# Patient Record
Sex: MALE | URBAN - METROPOLITAN AREA
[De-identification: names, ages, dates, MRNs, and addresses within clinical notes are randomized per-mention and may not be internally consistent; named-entity substitution may affect disease eponyms.]

---

## 2021-11-08 ENCOUNTER — PATIENT OUTREACH (OUTPATIENT)
Dept: GERIATRIC MEDICINE | Facility: CLINIC | Age: 66
End: 2021-11-08
Payer: COMMERCIAL

## 2021-11-08 NOTE — LETTER
November 8, 2021    EVELIO DIGGS  [address] [facility] [address]    Dear  Evelio,    Welcome to [facility] s [place] [facility] Plus ([facility] health program. My name is GONSALO Solomon. I am your [facility] care coordinator. You are eligible for Care Coordination through [facility]+ Product.    Here is how Care Coordination works:    I will meet with you in person to determine your care coordination needs    We will develop a plan of care to meet your needs    We will create a service plan showing the services you will receive    We will talk about and coordinate any preventive care needs you have    I will call you soon to see how you are doing and determine what needs you may have. Our goal is to keep you as healthy and independent as possible.     Soon, you will receive a new [facility] member identification (ID) card from [facility]. When you receive it, please use this card along with your [place] Health Care Programs card and Prescription Drug Coverage Program card. When you receive, it please use this card where you get your health services. If you have Medicare, you will need to show your Medicare card when you get health services.    Being in the [place] [facility] Plus ([facility]+) Care Coordination program is voluntary and offered to you at no cost. If you ever wish to stop being in the Care Coordination program or have questions, call me at [phone]. If you reach my voice mail, leave a message and your phone number. If you are hearing impaired, please call the [place] Relay at [phone] or [phone] (srbyov-xy-julmuq relay service).    Sincerely,      GONSALO Solomon    E-mail: [email or web address]  Phone: [phone]      [employer]      [id number] accepted   (12/2019)

## 2021-11-08 NOTE — LETTER
November 24, 2021    EVELIO DIGGS  [address] [facility] [address]    Dear Evelio:     Your Care Coordinator has been unable to reach you by telephone.I am writing to ask you or an authorized representative to call me at [phone]. If you reach my voicemail, please leave a message with your daytime telephone number and a date and time that I can call you. If you are hearing impaired, please call the [place] Relay at 271 or [phone] (obvipe-zr-ylbrgm relay service).     The reason I am trying to reach you is:     [] Six (6) month check-in  [] To schedule your annual assessment  [x] Other: To schedule your initial assessment      Please call me as soon as you receive this letter. I look forward to speaking with you.    Sincerely,    GONSALO Solomon    E-mail: [email or web address]  Phone: [phone]      [facility]+ [id number] DHS Approved([id number])    [id number] (2/19)

## 2021-11-08 NOTE — PROGRESS NOTES
[facility] Care Coordination Contact    Member became effective with [facility] on 11/1/2021 with Suzie MSC+.  Previous Health Plan: PMAP  Previous Care System: [facility]  Previous care coordinators name and number: NA, no MMIS  Waiver Type: N/A  Last MMIS Entry: Date NA and Type NA  MMIS visit date (and type) if different from above: NA  Services Listed in MMIS: NA  UTF received: No: Requested on No UTF to request as no MMIS or services.      Aliya Hairston  Care Management Specialist  [facility]  [phone]

## 2021-11-19 NOTE — PROGRESS NOTES
11/19/2021  SANKET/OMAIRA Carmichael. Introduced self as new CC and asked for a call back.    Lupe Griffin THOMAS  [employer]   [phone] (Cell)  [phone] (Office)  [phone] (fax)  [email or web address]

## 2021-11-23 NOTE — PROGRESS NOTES
11/23/21  L/M using phone  for Amol. Asked for a call back.    GONSALO Solomon  [facility]   [phone] (Cell)  [phone] (Office)  [phone] (fax)  [email or web address]

## 2021-11-24 NOTE — PROGRESS NOTES
"[facility] Care Coordination Contact    Per CC, mailed client an \"Unable to Contact\" letter for initial assessment.     Aliya Hairston  Care Management Specialist  [facility]  [phone]      "

## 2021-11-30 NOTE — PROGRESS NOTES
11/30/21  PC to member using phone . No answer and no working VM.    Completed 4 attempts to reach client with no response.  Member is officially unable to contact effective today.  Completed MMIS entry.  Completed health plan required [facility] POC.    Follow-up Plan: CC will attempt to reach member in six months.    GONSALO Solomon  [facility]   [phone] (Cell)  [phone] (Office)  [phone] (fax)  [email or web address]

## 2022-04-19 ENCOUNTER — APPOINTMENT (OUTPATIENT)
Dept: INTERPRETER SERVICES | Facility: CLINIC | Age: 67
End: 2022-04-19

## 2022-06-24 ENCOUNTER — PATIENT OUTREACH (OUTPATIENT)
Dept: GERIATRIC MEDICINE | Facility: CLINIC | Age: 67
End: 2022-06-24

## 2022-06-24 NOTE — PROGRESS NOTES
[facility] Six-Month Telephone Assessment    6 month telephone assessment completed on 6/24/22. First time speaking with Amol. He was an [employer] at initial transfer. Daughter helped interpret. He reports he goes to [facility] in [place] for primary care. Explained that Chevy will be notified to transfer to [facility] for Care coordination.  He would like an assessment. Reports he is independent in all areas, drives, and works. Will complete HRA assessment on 6/27/22.    ER visits: No  Hospitalizations: No  TCU stays: No  Significant health status changes: no  Falls/Injuries: No  ADL/IADL changes: No  Changes in services: No    Caregiver Assessment follow up:  n/a    Goals: See POC in chart for goal progress documentation.  n/a    Will see member in 6 months for an annual health risk assessment.   Encouraged member to call CC with any questions or concerns in the meantime.     GONSALO Solomon  [facility]   [phone] (Cell)  [phone] (Office)  [phone] (fax)  [email or web address]

## 2022-06-27 ENCOUNTER — PATIENT OUTREACH (OUTPATIENT)
Dept: GERIATRIC MEDICINE | Facility: CLINIC | Age: 67
End: 2022-06-27

## 2022-06-27 NOTE — LETTER
June 28, 2022    EVELIO DIGGS  [address] [place] [id number]        Dear Evelio:    As a member of [facility]'s MSC+ program, we offer a health risk assessment at no cost to you. I know you don't want to have the assessment right now. If you change your mind, please call me at the number below.    Who performs the health risk assessment?  A [facility] Care Coordinator performs the assessment. Our Care Coordinators can also help you understand your benefits. They can tell you about services to aid you at home, such as managing your care with your doctors if your health worsens.    Our Care Coordinators are here for you if you need:    Support for activities you used to do by yourself (including making meals, bathing and paying bills)    Equipment for bathroom or home safety    Help finding a new place to live    Information on staying healthy, preventing falls and immunizations    Questions?  If you have questions, or you would like to do he assessment, call me at [phone]. TTY users call [phone]. I'm here from 8am to 5pm. I may reach out to you again soon.       Sincerely,         GONSALO Solomon    E-mail: [email or web address]  Phone: [phone]      [employer]        \<[id number] accepted  [id number]>    [id number] (21/2021)

## 2022-06-28 NOTE — PROGRESS NOTES
[facility] Care Coordination Contact      [facility] Refusal Telephone Assessment    Member refused home visit HRA on 6/27/22 (reason: he is not a part of the [facility]. He has primary care with [facility].  He prefers to have an assessment with his new CC in the [facility] system).    ER visits: No  Hospitalizations: No  Health concerns: none reported  Falls/Injuries: No  ADL/IADL Dependencies: independent        Member currently receiving the following home care services:   none  Member currently receiving the following community resources:  none  Informal support(s):  daughter    Advanced Care Planning discussion, complete code section.    [facility] Health Plan sponsored benefits: Shared information re: Silver Sneakers/gym memberships, ASA, Calcium +D.    Follow-Up Plan: Member informed of future contact, plan to f/u with member with a 6 month telephone assessment and offer a home visit.  Contact information shared with member and family, encouraged member to call with any questions or concerns at any time.    This CC note routed to PCP- n/a- not established with [facility] and PCP cannot be confirmed other than being in the [facility] system.    GONSALO Solomon  [facility]   [phone] (Cell)  [phone] (Office)  [phone] (fax)  [email or web address]

## 2022-06-28 NOTE — PROGRESS NOTES
"[facility] Care Coordination Contact    Per CC, mailed client a \"Refusal of Home Visit\" letter.    Aliya Hairston  Care Management Specialist  [facility]  [phone]      "

## 2022-08-08 ENCOUNTER — PATIENT OUTREACH (OUTPATIENT)
Dept: GERIATRIC MEDICINE | Facility: CLINIC | Age: 67
End: 2022-08-08

## 2022-08-08 NOTE — PROGRESS NOTES
[facility] Care Coordination Contact    [facility]  Disenrollment check-list completed, UTF done, chart handed off to CMS to process.     GONSALO Solomon  [facility]   [phone] (Cell)  [phone] (Office)  [phone] (fax)  [email or web address]